# Patient Record
Sex: MALE | URBAN - METROPOLITAN AREA
[De-identification: names, ages, dates, MRNs, and addresses within clinical notes are randomized per-mention and may not be internally consistent; named-entity substitution may affect disease eponyms.]

---

## 2023-06-08 ENCOUNTER — OFFICE VISIT (OUTPATIENT)
Dept: URBAN - METROPOLITAN AREA CLINIC 3 | Facility: CLINIC | Age: 63
End: 2023-06-08

## 2023-06-08 DIAGNOSIS — H16.011 CENTRAL CORNEAL ULCER, RIGHT EYE: Primary | ICD-10-CM

## 2023-06-08 PROCEDURE — 92002 INTRM OPH EXAM NEW PATIENT: CPT | Performed by: OPTOMETRIST

## 2023-06-08 RX ORDER — MOXIFLOXACIN HYDROCHLORIDE 5 MG/ML
0.5 % SOLUTION/ DROPS OPHTHALMIC
Qty: 10 | Refills: 0 | Status: INACTIVE
Start: 2023-06-08 | End: 2023-06-09

## 2023-06-08 RX ORDER — ERYTHROMYCIN 5 MG/G
OINTMENT OPHTHALMIC
Qty: 3.5 | Refills: 0 | Status: INACTIVE
Start: 2023-06-08 | End: 2023-07-07

## 2023-06-08 RX ORDER — NATAMYCIN 50 MG/ML
5 % SUSPENSION/ DROPS OPHTHALMIC
Qty: 10 | Refills: 0 | Status: INACTIVE
Start: 2023-06-08 | End: 2023-07-07

## 2023-06-08 ASSESSMENT — INTRAOCULAR PRESSURE
OS: 19
OD: 10

## 2023-06-08 NOTE — IMPRESSION/PLAN
Impression: Central corneal ulcer, right eye: H16.011. Plan: Suspected Fungal corneal ulcer Vs Bacterial corneal ulcer right eye. BVA 20/200. Started Natamycin Q2H while awake, Vigamox Q2H while awake and Erythromycin QHS. Referral given to see Dr. Kelsi Arredondo for evaluation and management ASAP.